# Patient Record
Sex: FEMALE | Race: WHITE | NOT HISPANIC OR LATINO
[De-identification: names, ages, dates, MRNs, and addresses within clinical notes are randomized per-mention and may not be internally consistent; named-entity substitution may affect disease eponyms.]

---

## 2019-01-01 ENCOUNTER — EXTERNAL RECORD (OUTPATIENT)
Dept: HEALTH INFORMATION MANAGEMENT | Facility: OTHER | Age: 59
End: 2019-01-01

## 2019-08-07 ENCOUNTER — EXTERNAL RECORD (OUTPATIENT)
Dept: GASTROENTEROLOGY | Age: 59
End: 2019-08-07

## 2019-08-07 ENCOUNTER — EXTERNAL RECORD (OUTPATIENT)
Dept: OTHER | Age: 59
End: 2019-08-07

## 2019-08-07 LAB
AMMONIA: 122 UMOL/L (ref 16–52)
APPEARANCE: CLEAR
BEDSIDE GLUCOSE: 107 MG/DL (ref 70–110)
BEDSIDE GLUCOSE: 136 MG/DL (ref 70–110)
BEDSIDE GLUCOSE: 144 MG/DL (ref 70–110)
BILIRUBIN: ABNORMAL
COLOR: ABNORMAL
CREATININE, RANDOM URINE: 382.5 MG/DL (ref 8–198)
CULTURE REFLEX COMMENT: NO
GLUCOSE, URINE, AUTOMATED: ABNORMAL MG/DL
KETONES, URINE, AUTOMATED: ABNORMAL MG/DL
LEUKOCYTE, URINE, AUTOMATED: ABNORMAL
NITRITE, URINE AUTOMATED: NEGATIVE
PH, URINE: 5 (ref 5–8)
PROTEIN, URINE: ABNORMAL MG/DL
SODIUM, RANDOM URINE: 10.5 MEQ/L (ref 40–220)
SPECIFIC GRAVITY UA: 1.02 (ref 1–1.03)
URINE, BLOOD, AUTOMATED: ABNORMAL
UROBILINOGEN, URINE, AUTOMATED: 2 MG/DL

## 2019-08-07 PROCEDURE — 99222 1ST HOSP IP/OBS MODERATE 55: CPT | Performed by: INTERNAL MEDICINE

## 2019-08-08 LAB
*MEAN CORPUSCULAR HGB CONC: 33.5 G/DL (ref 32.5–35.8)
A/G RATIO: 1.27 (ref 1.1–2.4)
ALANINE AMINOTRANSFE: 12 U/L (ref 7–52)
ALBUMIN, SERUM (ALB): 2.8 G/DL (ref 3.5–5.7)
ALKALINE PHOSPHATASE (ALK): 123 U/L (ref 34–104)
AMMONIA: 105 UMOL/L (ref 16–52)
ANION GAP: 8 MEQ/L (ref 8–16)
ASPARTATE AMINOTRANS: 21 U/L (ref 13–39)
BASOPHIL AUTOMATED: 0.4 %
BASOPHILS: 0 (ref 0–0.2)
BEDSIDE GLUCOSE: 114 MG/DL (ref 70–110)
BEDSIDE GLUCOSE: 182 MG/DL (ref 70–110)
BEDSIDE GLUCOSE: 183 MG/DL (ref 70–110)
BEDSIDE GLUCOSE: 70 MG/DL (ref 70–110)
BILIRUBIN, TOTAL: 1.7 MG/DL (ref 0.2–0.8)
BLOOD UREA NITROGEN (BUN): 24 MG/DL (ref 7–25)
BUN/CREATININE RATIO: 16.4 (ref 7.4–23)
CALCIUM, SERUM: 8.2 MG/DL (ref 8.6–10.3)
CARBON DIOXIDE: 22 MMOL/L (ref 21–31)
CHLORIDE, SERUM: 111 MMOL/L (ref 98–107)
CREATININE: 1.46 MG/DL (ref 0.6–1.2)
EOSINOPHIL AUTOMATED: 4.4 %
EOSINOPHILS: 0.2 (ref 0–0.5)
EST GLOMERULAR FILTRATION RATE: 37 1.73M SQ
GLUCOSE: 69 MG/DL (ref 70–99)
HEMATOCRIT: 28.3 % (ref 34.7–45.1)
HEMOGLOBIN: 9.5 GM/DL (ref 12–15.3)
K (POTASSIUM, SERUM): 3.8 MMOL/L (ref 3.5–5.1)
LYMPHOCYTE AUTOMATED: 24.5 %
LYMPHOCYTES: 1.2 (ref 0.6–3.4)
MEAN CORPUSCULAR HGB: 31.4 PG (ref 26–34)
MEAN CORPUSCULAR VOL: 93.8 FL (ref 80–100)
MEAN PLATELET VOLUME: 7.3 FL (ref 6.8–10.2)
MG (MAGNESIUM, SERUM: 1.6 MG/DL (ref 1.6–2.6)
MONOCYTE AUTOMATED: 11.5 %
MONOCYTES: 0.5 (ref 0.3–1)
NA (SODIUM, SERUM): 141 MMOL/L (ref 133–144)
NEUTROPHIL ABSOLUTE: 2.8 (ref 1.7–7.7)
NEUTROPHIL AUTOMATED: 59.2 %
PHOSPHORUS, SERUM: 3.7 MG/DL (ref 2.5–4.5)
PLATELET COUNT: 100 K/MM3 (ref 150–450)
PROTEIN TOTAL: 5 G/DL (ref 6.4–8.9)
RED BLOOD CELL COUNT: 3.02 M/MM3 (ref 3.63–5.04)
RED CELL DISTRIBUTIO: 14.5 % (ref 11.9–15.9)
TOTAL PROTEIN, BODY FLUID: 1.2 G/DL
WHITE BLOOD CELL COU: 4.8 K/MM3 (ref 4–11)

## 2019-08-08 PROCEDURE — 99232 SBSQ HOSP IP/OBS MODERATE 35: CPT | Performed by: INTERNAL MEDICINE

## 2019-08-09 LAB
*MEAN CORPUSCULAR HGB CONC: 34.4 G/DL (ref 32.5–35.8)
A/G RATIO: 1.68 (ref 1.1–2.4)
ALANINE AMINOTRANSFE: 11 U/L (ref 7–52)
ALBUMIN, SERUM (ALB): 3.2 G/DL (ref 3.5–5.7)
ALKALINE PHOSPHATASE (ALK): 105 U/L (ref 34–104)
ANION GAP: 6 MEQ/L (ref 8–16)
ASPARTATE AMINOTRANS: 22 U/L (ref 13–39)
BASOPHIL AUTOMATED: 0.7 %
BASOPHILS: 0 (ref 0–0.2)
BEDSIDE GLUCOSE: 148 MG/DL (ref 70–110)
BEDSIDE GLUCOSE: 164 MG/DL (ref 70–110)
BEDSIDE GLUCOSE: 198 MG/DL (ref 70–110)
BEDSIDE GLUCOSE: 220 MG/DL (ref 70–110)
BILIRUBIN, TOTAL: 1.6 MG/DL (ref 0.2–0.8)
BLOOD UREA NITROGEN (BUN): 20 MG/DL (ref 7–25)
BODY FLUID COLOR: YELLOW
BODY FLUID TYPE: NORMAL
BODY FLUID, DIFF COMMENT: NORMAL
BODY FLUID, EO%: 0 %
BODY FLUID, LYMPH%: 44 %
BODY FLUID, MONO%: 26 %
BODY FLUID, NE%: 15 %
BODY FLUID, NUCLEATED CELLS: 300 /CMM
BODY FLUID, OTHER CELLS%: 15 %
BUN/CREATININE RATIO: 16.8 (ref 7.4–23)
CALCIUM, SERUM: 8.4 MG/DL (ref 8.6–10.3)
CARBON DIOXIDE: 26 MMOL/L (ref 21–31)
CHLORIDE, SERUM: 110 MMOL/L (ref 98–107)
CREATININE: 1.19 MG/DL (ref 0.6–1.2)
EOSINOPHIL AUTOMATED: 5.3 %
EOSINOPHILS: 0.2 (ref 0–0.5)
EST GLOMERULAR FILTRATION RATE: 47 1.73M SQ
GLUCOSE: 140 MG/DL (ref 70–99)
HEMATOCRIT: 27.8 % (ref 34.7–45.1)
HEMOGLOBIN: 9.6 GM/DL (ref 12–15.3)
K (POTASSIUM, SERUM): 4 MMOL/L (ref 3.5–5.1)
LYMPHOCYTE AUTOMATED: 27.7 %
LYMPHOCYTES: 1.1 (ref 0.6–3.4)
MEAN CORPUSCULAR HGB: 32.3 PG (ref 26–34)
MEAN CORPUSCULAR VOL: 93.7 FL (ref 80–100)
MEAN PLATELET VOLUME: 7.7 FL (ref 6.8–10.2)
MG (MAGNESIUM, SERUM: 2 MG/DL (ref 1.6–2.6)
MONOCYTE AUTOMATED: 12.7 %
MONOCYTES: 0.5 (ref 0.3–1)
NA (SODIUM, SERUM): 142 MMOL/L (ref 133–144)
NEUTROPHIL ABSOLUTE: 2 (ref 1.7–7.7)
NEUTROPHIL AUTOMATED: 53.6 %
PLATELET COUNT: 92 K/MM3 (ref 150–450)
PROTEIN TOTAL: 5.1 G/DL (ref 6.4–8.9)
RBC BODY FLUID: < 1000 /CMM
RED BLOOD CELL COUNT: 2.97 M/MM3 (ref 3.63–5.04)
RED CELL DISTRIBUTIO: 14.3 % (ref 11.9–15.9)
WHITE BLOOD CELL COU: 3.8 K/MM3 (ref 4–11)

## 2019-08-09 PROCEDURE — 99232 SBSQ HOSP IP/OBS MODERATE 35: CPT | Performed by: INTERNAL MEDICINE

## 2019-08-10 ENCOUNTER — TELEPHONE (OUTPATIENT)
Dept: GASTROENTEROLOGY | Age: 59
End: 2019-08-10

## 2019-08-10 DIAGNOSIS — R18.8 OTHER ASCITES: Primary | ICD-10-CM

## 2019-08-10 LAB
*MEAN CORPUSCULAR HGB CONC: 34.7 G/DL (ref 32.5–35.8)
A/G RATIO: 1.58 (ref 1.1–2.4)
ALANINE AMINOTRANSFE: 9 U/L (ref 7–52)
ALBUMIN, SERUM (ALB): 3 G/DL (ref 3.5–5.7)
ALKALINE PHOSPHATASE (ALK): 94 U/L (ref 34–104)
ANION GAP: 3 MEQ/L (ref 8–16)
ASPARTATE AMINOTRANS: 19 U/L (ref 13–39)
BASOPHIL AUTOMATED: 1.2 %
BASOPHILS: 0.1 (ref 0–0.2)
BEDSIDE GLUCOSE: 169 MG/DL (ref 70–110)
BEDSIDE GLUCOSE: 221 MG/DL (ref 70–110)
BILIRUBIN, TOTAL: 1.4 MG/DL (ref 0.2–0.8)
BLOOD UREA NITROGEN (BUN): 15 MG/DL (ref 7–25)
BUN/CREATININE RATIO: 14.4 (ref 7.4–23)
CALCIUM, SERUM: 8.2 MG/DL (ref 8.6–10.3)
CARBON DIOXIDE: 26 MMOL/L (ref 21–31)
CHLORIDE, SERUM: 112 MMOL/L (ref 98–107)
CREATININE: 1.04 MG/DL (ref 0.6–1.2)
EOSINOPHIL AUTOMATED: 4.7 %
EOSINOPHILS: 0.2 (ref 0–0.5)
EST GLOMERULAR FILTRATION RATE: 55 1.73M SQ
GLUCOSE: 151 MG/DL (ref 70–99)
HEMATOCRIT: 27.8 % (ref 34.7–45.1)
HEMOGLOBIN: 9.7 GM/DL (ref 12–15.3)
K (POTASSIUM, SERUM): 3.7 MMOL/L (ref 3.5–5.1)
LYMPHOCYTE AUTOMATED: 23.7 %
LYMPHOCYTES: 1 (ref 0.6–3.4)
MEAN CORPUSCULAR HGB: 32.4 PG (ref 26–34)
MEAN CORPUSCULAR VOL: 93.4 FL (ref 80–100)
MEAN PLATELET VOLUME: 7.7 FL (ref 6.8–10.2)
MG (MAGNESIUM, SERUM: 1.6 MG/DL (ref 1.6–2.6)
MONOCYTE AUTOMATED: 12.9 %
MONOCYTES: 0.5 (ref 0.3–1)
NA (SODIUM, SERUM): 141 MMOL/L (ref 133–144)
NEUTROPHIL ABSOLUTE: 2.4 (ref 1.7–7.7)
NEUTROPHIL AUTOMATED: 57.5 %
PLATELET COUNT: 100 K/MM3 (ref 150–450)
PROTEIN TOTAL: 4.9 G/DL (ref 6.4–8.9)
RED BLOOD CELL COUNT: 2.98 M/MM3 (ref 3.63–5.04)
RED CELL DISTRIBUTIO: 14 % (ref 11.9–15.9)
WHITE BLOOD CELL COU: 4.1 K/MM3 (ref 4–11)

## 2019-08-12 ENCOUNTER — EXTERNAL RECORD (OUTPATIENT)
Dept: HEALTH INFORMATION MANAGEMENT | Facility: OTHER | Age: 59
End: 2019-08-12

## 2019-08-12 LAB
ALBUMIN, PERITONEAL FLUID: 1.3 G/DL
BODY FLUID CULTURE: NORMAL
GRAM STAIN: NORMAL
TOTAL PROTEIN, BODY FLUID: 1.7 G/DL

## 2019-08-13 LAB — ANAEROBIC CULTURE: NORMAL

## 2019-08-16 LAB
BODY FLUID COLOR: YELLOW
BODY FLUID TYPE: NORMAL
BODY FLUID, DIFF COMMENT: NORMAL
BODY FLUID, EO%: 0 %
BODY FLUID, LYMPH%: 42 %
BODY FLUID, MONO%: 4 %
BODY FLUID, NE%: 31 %
BODY FLUID, NUCLEATED CELLS: 500 /CMM
BODY FLUID, OTHER CELLS%: 23 %
RBC BODY FLUID: < 1000 /CMM

## 2019-08-23 ENCOUNTER — EXTERNAL RECORD (OUTPATIENT)
Dept: OTHER | Age: 59
End: 2019-08-23

## 2019-08-23 LAB
*MEAN CORPUSCULAR HGB CONC: 33.1 G/DL (ref 32.5–35.8)
*MEAN CORPUSCULAR HGB CONC: 34.3 G/DL (ref 32.5–35.8)
A/G RATIO: 1.22 (ref 1.1–2.4)
ALANINE AMINOTRANSFE: 12 U/L (ref 7–52)
ALBUMIN, SERUM (ALB): 2.8 G/DL (ref 3.5–5.7)
ALKALINE PHOSPHATASE (ALK): 127 U/L (ref 34–104)
ANION GAP: 6 MEQ/L (ref 8–16)
ASPARTATE AMINOTRANS: 28 U/L (ref 13–39)
BASOPHIL AUTOMATED: 0.2 %
BASOPHIL AUTOMATED: 1 %
BASOPHILS: 0 (ref 0–0.2)
BASOPHILS: 0 (ref 0–0.2)
BEDSIDE GLUCOSE: 127 MG/DL (ref 70–110)
BEDSIDE GLUCOSE: 139 MG/DL (ref 70–110)
BEDSIDE GLUCOSE: 144 MG/DL (ref 70–110)
BEDSIDE GLUCOSE: 183 MG/DL (ref 70–110)
BILIRUBIN, TOTAL: 1.9 MG/DL (ref 0.2–0.8)
BLOOD UREA NITROGEN (BUN): 13 MG/DL (ref 7–25)
BUN/CREATININE RATIO: 11.6 (ref 7.4–23)
CALCIUM, SERUM: 8.3 MG/DL (ref 8.6–10.3)
CARBON DIOXIDE: 26 MMOL/L (ref 21–31)
CHLORIDE, SERUM: 108 MMOL/L (ref 98–107)
CREATININE: 1.12 MG/DL (ref 0.6–1.2)
EOSINOPHIL AUTOMATED: 4.5 %
EOSINOPHIL AUTOMATED: 4.8 %
EOSINOPHILS: 0.2 (ref 0–0.5)
EOSINOPHILS: 0.3 (ref 0–0.5)
EST GLOMERULAR FILTRATION RATE: 50 1.73M SQ
GLUCOSE: 152 MG/DL (ref 70–99)
HEMATOCRIT: 31.1 % (ref 34.7–45.1)
HEMATOCRIT: 32.7 % (ref 34.7–45.1)
HEMOGLOBIN: 10.3 GM/DL (ref 12–15.3)
HEMOGLOBIN: 11.2 GM/DL (ref 12–15.3)
K (POTASSIUM, SERUM): 3.4 MMOL/L (ref 3.5–5.1)
LYMPHOCYTE AUTOMATED: 18.7 %
LYMPHOCYTE AUTOMATED: 19.8 %
LYMPHOCYTES: 0.9 (ref 0.6–3.4)
LYMPHOCYTES: 1.1 (ref 0.6–3.4)
MEAN CORPUSCULAR HGB: 31.1 PG (ref 26–34)
MEAN CORPUSCULAR HGB: 32.1 PG (ref 26–34)
MEAN CORPUSCULAR VOL: 93.6 FL (ref 80–100)
MEAN CORPUSCULAR VOL: 94 FL (ref 80–100)
MEAN PLATELET VOLUME: 8 FL (ref 6.8–10.2)
MEAN PLATELET VOLUME: 8.3 FL (ref 6.8–10.2)
MG (MAGNESIUM, SERUM: 1.8 MG/DL (ref 1.6–2.6)
MONOCYTE AUTOMATED: 11 %
MONOCYTE AUTOMATED: 11.2 %
MONOCYTES: 0.6 (ref 0.3–1)
MONOCYTES: 0.6 (ref 0.3–1)
NA (SODIUM, SERUM): 140 MMOL/L (ref 133–144)
NEUTROPHIL ABSOLUTE: 3.3 (ref 1.7–7.7)
NEUTROPHIL ABSOLUTE: 3.6 (ref 1.7–7.7)
NEUTROPHIL AUTOMATED: 64 %
NEUTROPHIL AUTOMATED: 64.8 %
PLATELET COUNT: 91 K/MM3 (ref 150–450)
PLATELET COUNT: 92 K/MM3 (ref 150–450)
PROTEIN TOTAL: 5.1 G/DL (ref 6.4–8.9)
RED BLOOD CELL COUNT: 3.31 M/MM3 (ref 3.63–5.04)
RED BLOOD CELL COUNT: 3.5 M/MM3 (ref 3.63–5.04)
RED CELL DISTRIBUTIO: 14.1 % (ref 11.9–15.9)
RED CELL DISTRIBUTIO: 14.7 % (ref 11.9–15.9)
WHITE BLOOD CELL COU: 5.1 K/MM3 (ref 4–11)
WHITE BLOOD CELL COU: 5.6 K/MM3 (ref 4–11)

## 2019-08-24 ENCOUNTER — EXTERNAL RECORD (OUTPATIENT)
Dept: GASTROENTEROLOGY | Age: 59
End: 2019-08-24

## 2019-08-24 LAB
A/G RATIO: 1.29 (ref 1.1–2.4)
ALANINE AMINOTRANSFE: 11 U/L (ref 7–52)
ALBUMIN, SERUM (ALB): 2.7 G/DL (ref 3.5–5.7)
ALKALINE PHOSPHATASE (ALK): 125 U/L (ref 34–104)
ANION GAP: 4 MEQ/L (ref 8–16)
ASPARTATE AMINOTRANS: 28 U/L (ref 13–39)
BEDSIDE GLUCOSE: 100 MG/DL (ref 70–110)
BEDSIDE GLUCOSE: 108 MG/DL (ref 70–110)
BEDSIDE GLUCOSE: 117 MG/DL (ref 70–110)
BEDSIDE GLUCOSE: 198 MG/DL (ref 70–110)
BILIRUBIN, TOTAL: 2.1 MG/DL (ref 0.2–0.8)
BLOOD UREA NITROGEN (BUN): 11 MG/DL (ref 7–25)
BUN/CREATININE RATIO: 9.9 (ref 7.4–23)
CALCIUM, SERUM: 8.1 MG/DL (ref 8.6–10.3)
CARBON DIOXIDE: 25 MMOL/L (ref 21–31)
CHLORIDE, SERUM: 110 MMOL/L (ref 98–107)
CREATININE: 1.11 MG/DL (ref 0.6–1.2)
EST GLOMERULAR FILTRATION RATE: 51 1.73M SQ
GLUCOSE: 106 MG/DL (ref 70–99)
K (POTASSIUM, SERUM): 3.8 MMOL/L (ref 3.5–5.1)
NA (SODIUM, SERUM): 139 MMOL/L (ref 133–144)
PROTEIN TOTAL: 4.8 G/DL (ref 6.4–8.9)

## 2019-08-24 PROCEDURE — 99221 1ST HOSP IP/OBS SF/LOW 40: CPT | Performed by: INTERNAL MEDICINE

## 2019-08-25 LAB
*MEAN CORPUSCULAR HGB CONC: 33.2 G/DL (ref 32.5–35.8)
ALBUMIN, PERITONEAL FLUID: 1 G/DL
ANION GAP: 5 MEQ/L (ref 8–16)
BEDSIDE GLUCOSE: 133 MG/DL (ref 70–110)
BLOOD UREA NITROGEN (BUN): 11 MG/DL (ref 7–25)
BUN/CREATININE RATIO: 10.3 (ref 7.4–23)
CALCIUM, SERUM: 8 MG/DL (ref 8.6–10.3)
CARBON DIOXIDE: 26 MMOL/L (ref 21–31)
CHLORIDE, SERUM: 109 MMOL/L (ref 98–107)
CREATININE: 1.07 MG/DL (ref 0.6–1.2)
EST GLOMERULAR FILTRATION RATE: 53 1.73M SQ
GLUCOSE: 118 MG/DL (ref 70–99)
HEMATOCRIT: 30.5 % (ref 34.7–45.1)
HEMOGLOBIN: 10.1 GM/DL (ref 12–15.3)
K (POTASSIUM, SERUM): 3.7 MMOL/L (ref 3.5–5.1)
MEAN CORPUSCULAR HGB: 31.2 PG (ref 26–34)
MEAN CORPUSCULAR VOL: 94.1 FL (ref 80–100)
MEAN PLATELET VOLUME: 8.1 FL (ref 6.8–10.2)
NA (SODIUM, SERUM): 140 MMOL/L (ref 133–144)
PLATELET COUNT: 86 K/MM3 (ref 150–450)
RED BLOOD CELL COUNT: 3.24 M/MM3 (ref 3.63–5.04)
RED CELL DISTRIBUTIO: 14.4 % (ref 11.9–15.9)
WHITE BLOOD CELL COU: 4.3 K/MM3 (ref 4–11)

## 2019-08-26 LAB
BODY FLUID COLOR: YELLOW
BODY FLUID TYPE: NORMAL
BODY FLUID, DIFF COMMENT: NORMAL
BODY FLUID, EO%: 0 %
BODY FLUID, LYMPH%: 29 %
BODY FLUID, MONO%: 32 %
BODY FLUID, NE%: 25 %
BODY FLUID, NUCLEATED CELLS: 500 /CMM
BODY FLUID, OTHER CELLS%: 9 %
RBC BODY FLUID: < 1000 /CMM

## 2019-08-29 LAB
BODY FLUID CULTURE: NORMAL
GRAM STAIN: NORMAL

## 2019-09-16 LAB
FUNGAL SMEAR: NORMAL
FUNGUS CULTURE: NORMAL

## 2019-10-16 ENCOUNTER — OFFICE VISIT (OUTPATIENT)
Dept: SURGERY | Facility: CLINIC | Age: 59
End: 2019-10-16
Payer: COMMERCIAL

## 2019-10-16 VITALS
SYSTOLIC BLOOD PRESSURE: 138 MMHG | WEIGHT: 215 LBS | HEART RATE: 72 BPM | RESPIRATION RATE: 18 BRPM | DIASTOLIC BLOOD PRESSURE: 76 MMHG | OXYGEN SATURATION: 100 % | TEMPERATURE: 98 F

## 2019-10-16 DIAGNOSIS — K74.60 LIVER CIRRHOSIS SECONDARY TO NASH (HCC): Primary | ICD-10-CM

## 2019-10-16 DIAGNOSIS — K75.81 LIVER CIRRHOSIS SECONDARY TO NASH (HCC): Primary | ICD-10-CM

## 2019-10-16 NOTE — PROGRESS NOTES
[facility] at [address], [address] [place] [address] [address]  [address]  [phone] ([phone] no murmur, no edema  Lungs: Clear to auscultation (B)  Abd: non-distended, non-tender, no hepatosplenomegaly  Derm: no rash  Neuro: A&Ox3, no asterixis  Psych: normal affect/mood    Data:  2/22/18 serologies - negative HAV/HBV/HCV, JOAQUIN(-), A1AT normal    6

## 2019-12-02 ENCOUNTER — OFFICE VISIT (OUTPATIENT)
Dept: SURGERY | Facility: CLINIC | Age: 59
End: 2019-12-02
Payer: COMMERCIAL

## 2019-12-02 VITALS
RESPIRATION RATE: 16 BRPM | WEIGHT: 230.5 LBS | BODY MASS INDEX: 34.93 KG/M2 | OXYGEN SATURATION: 100 % | HEIGHT: 68 IN | DIASTOLIC BLOOD PRESSURE: 70 MMHG | HEART RATE: 80 BPM | SYSTOLIC BLOOD PRESSURE: 139 MMHG

## 2019-12-02 DIAGNOSIS — K74.60 LIVER CIRRHOSIS SECONDARY TO NASH (HCC): Primary | ICD-10-CM

## 2019-12-02 DIAGNOSIS — K75.81 LIVER CIRRHOSIS SECONDARY TO NASH (HCC): Primary | ICD-10-CM

## 2019-12-02 RX ORDER — TRAMADOL HYDROCHLORIDE 50 MG/1
TABLET ORAL
Refills: 0 | COMMUNITY
Start: 2019-11-20

## 2019-12-02 RX ORDER — TRAZODONE HYDROCHLORIDE 150 MG/1
TABLET ORAL
Refills: 0 | COMMUNITY
Start: 2019-11-12

## 2019-12-02 RX ORDER — FUROSEMIDE 40 MG/1
40 TABLET ORAL 2 TIMES DAILY
Qty: 60 TABLET | Refills: 5 | Status: SHIPPED | OUTPATIENT
Start: 2019-12-02

## 2019-12-02 RX ORDER — GLIPIZIDE 10 MG/1
TABLET ORAL
Refills: 2 | COMMUNITY
Start: 2019-11-01

## 2019-12-02 RX ORDER — PIOGLITAZONEHYDROCHLORIDE 15 MG/1
TABLET ORAL
Refills: 0 | COMMUNITY
Start: 2019-11-04

## 2019-12-02 RX ORDER — SPIRONOLACTONE 25 MG/1
25 TABLET ORAL 2 TIMES DAILY
Qty: 60 TABLET | Refills: 5 | Status: SHIPPED | OUTPATIENT
Start: 2019-12-02

## 2019-12-02 RX ORDER — FLUCONAZOLE 150 MG/1
TABLET ORAL
Refills: 0 | COMMUNITY
Start: 2019-11-04 | End: 2020-03-02 | Stop reason: ALTCHOICE

## 2019-12-02 RX ORDER — LEVOTHYROXINE SODIUM 112 UG/1
TABLET ORAL
Refills: 2 | COMMUNITY
Start: 2019-11-13

## 2019-12-02 RX ORDER — POTASSIUM CHLORIDE 20 MEQ/1
TABLET, EXTENDED RELEASE ORAL
Refills: 0 | COMMUNITY
Start: 2019-08-27

## 2019-12-02 RX ORDER — MONTELUKAST SODIUM 10 MG/1
TABLET ORAL
Refills: 1 | COMMUNITY
Start: 2019-11-04

## 2019-12-02 RX ORDER — FUROSEMIDE 40 MG/1
TABLET ORAL
Refills: 0 | COMMUNITY
Start: 2019-10-07 | End: 2019-12-02 | Stop reason: DRUGHIGH

## 2019-12-02 RX ORDER — SPIRONOLACTONE 25 MG/1
TABLET ORAL
Refills: 3 | COMMUNITY
Start: 2019-11-01 | End: 2019-12-02 | Stop reason: DRUGHIGH

## 2019-12-02 NOTE — PROGRESS NOTES
[facility] at [address], [address] [place] [address] [address]  [address]  [phone] ([phone]) Lactulose  Levothyroxine  Tramadol  Trazodone     Social History:  No ETOH  No tobacco  No drugs       Allergies:   Penicillins             RASH    Comment:HIVES             HIVES    Exam:  /70 (BP Location: Left arm, Patient Position: Sitting, Cuf [address]  [place] [address], 7th floor, [place], [place], [address] [place] [address] (office)  [phone] (fax)  [email or web address]

## 2020-02-19 ENCOUNTER — EXTERNAL RECORD (OUTPATIENT)
Dept: OTHER | Age: 60
End: 2020-02-19

## 2020-02-19 ENCOUNTER — TELEPHONE (OUTPATIENT)
Dept: SURGERY | Facility: CLINIC | Age: 60
End: 2020-02-19

## 2020-02-19 ENCOUNTER — EXTERNAL RECORD (OUTPATIENT)
Dept: GASTROENTEROLOGY | Age: 60
End: 2020-02-19

## 2020-02-19 DIAGNOSIS — K75.81 LIVER CIRRHOSIS SECONDARY TO NASH (HCC): Primary | ICD-10-CM

## 2020-02-19 DIAGNOSIS — K74.60 LIVER CIRRHOSIS SECONDARY TO NASH (HCC): Primary | ICD-10-CM

## 2020-02-19 LAB
BEDSIDE GLUCOSE: 110 MG/DL (ref 70–110)
BEDSIDE GLUCOSE: 126 MG/DL (ref 70–110)
BEDSIDE GLUCOSE: 224 MG/DL (ref 70–110)
CREATININE, RANDOM URINE: 90.5 MG/DL (ref 8–198)
SODIUM, RANDOM URINE: 62.5 MEQ/L (ref 40–220)
TOTAL PROTEIN, RANDOM URINE: 5 MG/DL
TP/CREA RATIO, URINE RANDOM: 0.05 (ref 0–0.19)

## 2020-02-19 PROCEDURE — 99222 1ST HOSP IP/OBS MODERATE 55: CPT | Performed by: INTERNAL MEDICINE

## 2020-02-19 NOTE — TELEPHONE ENCOUNTER
Returned patient's call regarding recent admission. Placed order for standing paracentesis and having office faxed to [facility].     YUKI Mills  Nurse Practitioner, Hepatology  [phone] (office) vaginal bleeding

## 2020-02-20 LAB
*MEAN CORPUSCULAR HGB CONC: 33.3 G/DL (ref 32.5–35.8)
A/G RATIO: 0.92 (ref 1.1–2.4)
ALANINE AMINOTRANSFE: 12 U/L (ref 7–52)
ALBUMIN, SERUM (ALB): 2.4 G/DL (ref 3.5–5.7)
ALKALINE PHOSPHATASE (ALK): 94 U/L (ref 34–104)
AMMONIA: 59 UMOL/L (ref 16–52)
ANION GAP: 5 MEQ/L (ref 6.2–14.7)
ASPARTATE AMINOTRANS: 32 U/L (ref 13–39)
BASOPHIL AUTOMATED: 0.4 %
BASOPHILS: 0 (ref 0–0.2)
BEDSIDE GLUCOSE: 103 MG/DL (ref 70–110)
BEDSIDE GLUCOSE: 158 MG/DL (ref 70–110)
BEDSIDE GLUCOSE: 91 MG/DL (ref 70–110)
BEDSIDE GLUCOSE: 96 MG/DL (ref 70–110)
BILIRUBIN, TOTAL: 2 MG/DL (ref 0.2–0.8)
BLOOD UREA NITROGEN (BUN): 15 MG/DL (ref 7–25)
BUN/CREATININE RATIO: 9.7 (ref 7.4–23)
CALCIUM, SERUM: 8.1 MG/DL (ref 8.6–10.3)
CARBON DIOXIDE: 32 MMOL/L (ref 21–31)
CHLORIDE, SERUM: 102 MMOL/L (ref 98–107)
CREATININE: 1.54 MG/DL (ref 0.6–1.2)
EOSINOPHIL AUTOMATED: 6.5 %
EOSINOPHILS: 0.2 (ref 0–0.5)
EST GLOMERULAR FILTRATION RATE: 35 1.73M SQ
GLUCOSE: 100 MG/DL (ref 70–99)
HEMATOCRIT: 30.2 % (ref 34.7–45.1)
HEMOGLOBIN: 10.1 GM/DL (ref 12–15.3)
INTERNATIONAL NORMAL: 1.7 (ref 0.8–1.1)
K (POTASSIUM, SERUM): 3.5 MMOL/L (ref 3.5–5.1)
LYMPHOCYTE AUTOMATED: 24.4 %
LYMPHOCYTES: 0.9 (ref 0.6–3.4)
MEAN CORPUSCULAR HGB: 32.3 PG (ref 26–34)
MEAN CORPUSCULAR VOL: 96.8 FL (ref 80–100)
MEAN PLATELET VOLUME: 7.6 FL (ref 6.8–10.2)
MONOCYTE AUTOMATED: 10.4 %
MONOCYTES: 0.4 (ref 0.3–1)
NA (SODIUM, SERUM): 139 MMOL/L (ref 133–144)
NEUTROPHIL ABSOLUTE: 2.2 (ref 1.7–7.7)
NEUTROPHIL AUTOMATED: 58.3 %
PLATELET COUNT: 75 K/MM3 (ref 150–450)
PROTEIN TOTAL: 5 G/DL (ref 6.4–8.9)
PROTHROMBIN TIME (PATIENT): 20.1 SECONDS (ref 10.1–13.1)
RED BLOOD CELL COUNT: 3.12 M/MM3 (ref 3.63–5.04)
RED CELL DISTRIBUTIO: 15.6 % (ref 11.9–15.9)
WHITE BLOOD CELL COU: 3.8 K/MM3 (ref 4–11)

## 2020-02-20 PROCEDURE — 99233 SBSQ HOSP IP/OBS HIGH 50: CPT | Performed by: INTERNAL MEDICINE

## 2020-02-21 LAB
*MEAN CORPUSCULAR HGB CONC: 34 G/DL (ref 32.5–35.8)
A/G RATIO: 0.85 (ref 1.1–2.4)
ALANINE AMINOTRANSFE: 9 U/L (ref 7–52)
ALBUMIN, PERITONEAL FLUID: 1 G/DL
ALBUMIN, SERUM (ALB): 2.3 G/DL (ref 3.5–5.7)
ALKALINE PHOSPHATASE (ALK): 95 U/L (ref 34–104)
ANION GAP: 7 MEQ/L (ref 6.2–14.7)
ASPARTATE AMINOTRANS: 30 U/L (ref 13–39)
BASOPHIL AUTOMATED: 0.7 %
BASOPHILS: 0 (ref 0–0.2)
BEDSIDE GLUCOSE: 132 MG/DL (ref 70–110)
BEDSIDE GLUCOSE: 96 MG/DL (ref 70–110)
BILIRUBIN, TOTAL: 1.7 MG/DL (ref 0.2–0.8)
BLOOD UREA NITROGEN (BUN): 13 MG/DL (ref 7–25)
BODY FLUID COLOR: YELLOW
BODY FLUID TYPE: NORMAL
BODY FLUID, DIFF COMMENT: NORMAL
BODY FLUID, EO%: 0 %
BODY FLUID, LYMPH%: 37 %
BODY FLUID, MONO%: 18 %
BODY FLUID, NE%: 7 %
BODY FLUID, NUCLEATED CELLS: 220 /CMM
BODY FLUID, OTHER CELLS%: 38 %
BUN/CREATININE RATIO: 8.7 (ref 7.4–23)
CALCIUM, SERUM: 8.1 MG/DL (ref 8.6–10.3)
CARBON DIOXIDE: 29 MMOL/L (ref 21–31)
CHLORIDE, SERUM: 102 MMOL/L (ref 98–107)
CREATININE: 1.49 MG/DL (ref 0.6–1.2)
EOSINOPHIL AUTOMATED: 5 %
EOSINOPHILS: 0.2 (ref 0–0.5)
EST GLOMERULAR FILTRATION RATE: 36 1.73M SQ
GLUCOSE, BODY FLUID: 127 MG/DL
GLUCOSE: 152 MG/DL (ref 70–99)
HEMATOCRIT: 30.8 % (ref 34.7–45.1)
HEMOGLOBIN: 10.5 GM/DL (ref 12–15.3)
INTERNATIONAL NORMAL: 1.7 (ref 0.8–1.1)
K (POTASSIUM, SERUM): 3.2 MMOL/L (ref 3.5–5.1)
LYMPHOCYTE AUTOMATED: 23.3 %
LYMPHOCYTES: 0.9 (ref 0.6–3.4)
MEAN CORPUSCULAR HGB: 33.1 PG (ref 26–34)
MEAN CORPUSCULAR VOL: 97.2 FL (ref 80–100)
MEAN PLATELET VOLUME: 7.8 FL (ref 6.8–10.2)
MONOCYTE AUTOMATED: 9.6 %
MONOCYTES: 0.4 (ref 0.3–1)
NA (SODIUM, SERUM): 138 MMOL/L (ref 133–144)
NEUTROPHIL ABSOLUTE: 2.3 (ref 1.7–7.7)
NEUTROPHIL AUTOMATED: 61.4 %
PLATELET COUNT: 67 K/MM3 (ref 150–450)
PROTEIN TOTAL: 5 G/DL (ref 6.4–8.9)
PROTHROMBIN TIME (PATIENT): 20.4 SECONDS (ref 10.1–13.1)
RBC BODY FLUID: < 1000 /CMM
RED BLOOD CELL COUNT: 3.17 M/MM3 (ref 3.63–5.04)
RED CELL DISTRIBUTIO: 14.9 % (ref 11.9–15.9)
WHITE BLOOD CELL COU: 3.7 K/MM3 (ref 4–11)

## 2020-02-26 LAB
BODY FLUID CULTURE: NORMAL
GRAM STAIN: NORMAL

## 2020-02-27 LAB — ANAEROBIC CULTURE: NORMAL

## 2020-03-02 ENCOUNTER — OFFICE VISIT (OUTPATIENT)
Dept: SURGERY | Facility: CLINIC | Age: 60
End: 2020-03-02
Payer: COMMERCIAL

## 2020-03-02 VITALS
HEART RATE: 78 BPM | DIASTOLIC BLOOD PRESSURE: 69 MMHG | SYSTOLIC BLOOD PRESSURE: 124 MMHG | WEIGHT: 216.5 LBS | OXYGEN SATURATION: 99 % | HEIGHT: 68 IN | RESPIRATION RATE: 16 BRPM | BODY MASS INDEX: 32.81 KG/M2

## 2020-03-02 DIAGNOSIS — K75.81 LIVER CIRRHOSIS SECONDARY TO NASH (HCC): Primary | ICD-10-CM

## 2020-03-02 DIAGNOSIS — K74.60 LIVER CIRRHOSIS SECONDARY TO NASH (HCC): Primary | ICD-10-CM

## 2020-03-02 RX ORDER — PANTOPRAZOLE SODIUM 20 MG/1
20 TABLET, DELAYED RELEASE ORAL
COMMUNITY

## 2020-03-02 RX ORDER — CHOLECALCIFEROL (VITAMIN D3) 50 MCG
TABLET ORAL
COMMUNITY
Start: 2020-02-26

## 2020-03-02 NOTE — PROGRESS NOTES
[facility] at [facility]  [address] [facility], [address]  [address]  [phone] ([phone]) tobacco  No drugs       Allergies:   Penicillins             RASH    Comment:HIVES             HIVES    Exam:  There were no vitals taken for this visit.   Gen: NAD  HEENT: Anicteric  Lymph: no cervical LAD  Chest: no angiomata  CV: RRR, no murmur, 1+ edema show not immune to HAV and HBV; should get vaccinated. Return in 2 months. Patient discussed with and/or seen by Dr. Tamiko Nathan. YUKI Karimi  Nurse Practitioner  [facility] of [address]  [address]  [facility] [employer], [facility]

## 2020-03-10 ENCOUNTER — TELEPHONE (OUTPATIENT)
Dept: SURGERY | Facility: CLINIC | Age: 60
End: 2020-03-10

## 2020-03-10 NOTE — TELEPHONE ENCOUNTER
LVM letting patient know that she should begin receiving rifaximin from [facility]. There had been a delay and medication was not previously sent.     Additionally asked patient to call back regarding current lactulose prescription; patient indicated lou

## 2020-04-16 ENCOUNTER — TELEPHONE (OUTPATIENT)
Dept: SURGERY | Facility: CLINIC | Age: 60
End: 2020-04-16

## 2020-04-16 DIAGNOSIS — K74.60 LIVER CIRRHOSIS SECONDARY TO NASH (NONALCOHOLIC STEATOHEPATITIS) (HCC): Primary | ICD-10-CM

## 2020-04-16 DIAGNOSIS — K75.81 LIVER CIRRHOSIS SECONDARY TO NASH (NONALCOHOLIC STEATOHEPATITIS) (HCC): Primary | ICD-10-CM

## 2020-04-16 NOTE — TELEPHONE ENCOUNTER
Returned patient's phone call stating need for weekly paracentesis. Order updated; requesting office to fax to [facility].     YUKI Karimi  Nurse Practitioner, Hepatology  [phone] (office)

## 2020-05-04 ENCOUNTER — TELEPHONE (OUTPATIENT)
Dept: SURGERY | Facility: CLINIC | Age: 60
End: 2020-05-04

## 2020-05-04 NOTE — TELEPHONE ENCOUNTER
[facility] at [facility]  [address] [facility], [address]  [address]  [phone] ([phone]) Needing paracentesis more frequently. She reports getting weekly at this point  - Reports having labs done at [facility] in April but I cannot pull up records from [facility]  - Regardless, will plan on proceeding with transplant work up.  TIPS for recurrent

## 2020-05-13 LAB
COVID-19 RESULT: NOT DETECTED
COVID-19 SOURCE: NORMAL

## 2021-09-14 ENCOUNTER — LAB SERVICES (OUTPATIENT)
Dept: LAB | Age: 61
End: 2021-09-14

## 2021-09-14 ENCOUNTER — LAB REQUISITION (OUTPATIENT)
Dept: LAB | Age: 61
End: 2021-09-14

## 2021-09-14 DIAGNOSIS — Z51.81 ENCOUNTER FOR THERAPEUTIC DRUG MONITORING: ICD-10-CM

## 2021-09-14 DIAGNOSIS — Z94.4 LIVER TRANSPLANT STATUS (CMD): ICD-10-CM

## 2021-09-14 DIAGNOSIS — Z51.81 ENCOUNTER FOR THERAPEUTIC DRUG LEVEL MONITORING: ICD-10-CM

## 2021-09-14 DIAGNOSIS — Z94.4 LIVER REPLACED BY TRANSPLANT (CMD): Primary | ICD-10-CM

## 2021-09-14 LAB
ALBUMIN SERPL BCG-MCNC: 3.8 G/DL (ref 3.6–5.1)
ALP SERPL-CCNC: 92 U/L (ref 45–130)
ALT SERPL W/O P-5'-P-CCNC: 17 U/L (ref 7–34)
AST SERPL-CCNC: 13 U/L (ref 9–37)
BASOPHIL %: 0.2 % (ref 0–1.2)
BASOPHIL ABSOLUTE #: 0 10*3/UL (ref 0–0.1)
BILIRUB DIRECT SERPL-MCNC: 0.1 MG/DL (ref 0–0.2)
BILIRUB SERPL-MCNC: 0.6 MG/DL (ref 0–1)
BUN SERPL-MCNC: 32 MG/DL (ref 7–20)
CALCIUM SERPL-MCNC: 9.2 MG/DL (ref 8.6–10.6)
CHLORIDE SERPL-SCNC: 100 MMOL/L (ref 96–107)
CREAT SERPL-MCNC: 1.5 MG/DL (ref 0.5–1.4)
DIFFERENTIAL TYPE: ABNORMAL
EOSINOPHIL %: 1 % (ref 0–10)
EOSINOPHIL ABSOLUTE #: 0.1 10*3/UL (ref 0–0.5)
GFR SERPL CREATININE-BSD FRML MDRD: 35 ML/MIN/{1.73M2}
GFR SERPL CREATININE-BSD FRML MDRD: 43 ML/MIN/{1.73M2}
GLUCOSE SERPL-MCNC: 320 MG/DL (ref 70–200)
HCO3 SERPL-SCNC: 25 MMOL/L (ref 22–32)
HEMATOCRIT: 32.4 % (ref 34–45)
HEMOGLOBIN: 10.4 G/DL (ref 11.2–15.7)
IMMATURE GRANULOCYTE ABSOLUTE: 0.02 10*3/UL (ref 0–0.05)
IMMATURE GRANULOCYTE PERCENT: 0.4 % (ref 0–0.5)
LYMPH PERCENT: 46.4 % (ref 20.5–51.1)
LYMPHOCYTE ABSOLUTE #: 2.4 10*3/UL (ref 1.2–3.4)
MEAN CORPUSCULAR HGB CONCENTRATION: 32.1 % (ref 32–36)
MEAN CORPUSCULAR HGB: 30.1 PG (ref 27–34)
MEAN CORPUSCULAR VOLUME: 93.9 FL (ref 79–95)
MEAN PLATELET VOLUME: 9.5 FL (ref 8.6–12.4)
MONOCYTE ABSOLUTE #: 0.4 10*3/UL (ref 0.2–0.9)
MONOCYTE PERCENT: 7.8 % (ref 4.3–12.9)
NEUTROPHIL ABSOLUTE #: 2.3 10*3/UL (ref 1.4–6.5)
NEUTROPHIL PERCENT: 44.2 % (ref 34–73.5)
PLATELET COUNT: 172 10*3/UL (ref 150–400)
POTASSIUM SERPL-SCNC: 4.4 MMOL/L (ref 3.5–5.3)
PROT SERPL-MCNC: 6 G/DL (ref 6.2–8.1)
RED BLOOD CELL COUNT: 3.45 10*6/UL (ref 3.7–5.2)
RED CELL DISTRIBUTION WIDTH: 14.6 % (ref 11.3–14.8)
SODIUM SERPL-SCNC: 135 MMOL/L (ref 136–146)
WHITE BLOOD CELL COUNT: 5.1 10*3/UL (ref 4–10)

## 2021-09-14 PROCEDURE — 82977 ASSAY OF GGT: CPT | Performed by: INTERNAL MEDICINE

## 2021-09-14 PROCEDURE — 82248 BILIRUBIN DIRECT: CPT | Performed by: INTERNAL MEDICINE

## 2021-09-14 PROCEDURE — 85025 COMPLETE CBC W/AUTO DIFF WBC: CPT | Performed by: INTERNAL MEDICINE

## 2021-09-14 PROCEDURE — 80197 ASSAY OF TACROLIMUS: CPT | Performed by: CLINICAL MEDICAL LABORATORY

## 2021-09-14 PROCEDURE — 80053 COMPREHEN METABOLIC PANEL: CPT | Performed by: INTERNAL MEDICINE

## 2021-09-14 PROCEDURE — 36415 COLL VENOUS BLD VENIPUNCTURE: CPT | Performed by: INTERNAL MEDICINE

## 2021-09-15 LAB
GGT SERPL-CCNC: 42 U/L (ref 12–43)
TACROLIMUS BLD-MCNC: 5.5 NG/ML (ref 5–20)
TACROLIMUS BLD-MCNC: 5.5 NG/ML (ref 5–20)

## 2021-09-16 LAB — FAX RESULTS: NORMAL

## 2021-09-22 ENCOUNTER — EXTERNAL RECORD (OUTPATIENT)
Dept: OTHER | Age: 61
End: 2021-09-22

## 2021-09-22 LAB
BEDSIDE GLUCOSE: 284 MG/DL (ref 70–110)
BEDSIDE GLUCOSE: 403 MG/DL (ref 70–110)
TROPONIN I HIGH SENSITIVITY: 3 PG/ML (ref 0–12)

## 2021-09-22 PROCEDURE — 93010 ELECTROCARDIOGRAM REPORT: CPT | Performed by: INTERNAL MEDICINE

## 2021-09-23 ENCOUNTER — EXTERNAL RECORD (OUTPATIENT)
Dept: CARDIOLOGY | Age: 61
End: 2021-09-23

## 2021-09-23 LAB
*MEAN CORPUSCULAR HGB CONC: 32.7 G/DL (ref 32.5–35.8)
A/G RATIO: 1.72 (ref 1.1–2.4)
ALANINE AMINOTRANSFE: 37 U/L (ref 7–52)
ALBUMIN, SERUM (ALB): 3.1 G/DL (ref 3.5–5.7)
ALKALINE PHOSPHATASE (ALK): 51 U/L (ref 34–104)
ANION GAP: 6 MEQ/L (ref 6.2–14.7)
ASPARTATE AMINOTRANS: 65 U/L (ref 13–39)
BASOPHIL AUTOMATED: 0 %
BASOPHILS: 0 (ref 0–0.14)
BEDSIDE GLUCOSE: 144 MG/DL (ref 70–110)
BEDSIDE GLUCOSE: 217 MG/DL (ref 70–110)
BEDSIDE GLUCOSE: 295 MG/DL (ref 70–110)
BEDSIDE GLUCOSE: 324 MG/DL (ref 70–110)
BEDSIDE GLUCOSE: 453 MG/DL (ref 70–110)
BILIRUBIN, TOTAL: 0.4 MG/DL (ref 0.2–0.8)
BLOOD UREA NITROGEN (BUN): 29 MG/DL (ref 7–25)
BUN/CREATININE RATIO: 18.8 (ref 7.4–23)
CALCIUM, SERUM: 8.4 MG/DL (ref 8.6–10.3)
CARBON DIOXIDE: 29 MEQ/L (ref 21–31)
CHLORIDE, SERUM: 106 MMOL/L (ref 98–107)
CHRONIC KIDNEY DISEASE STAGE: ABNORMAL
CREATININE: 1.54 MG/DL (ref 0.6–1.2)
EOSINOPHIL AUTOMATED: 0.6 %
EOSINOPHILS: 0 (ref 0–0.6)
EST GLOMERULAR FILTRATION RATE: 35 ML/MIN
ESTIMATED AVERAGE GLUCOSE: 229 MG/DL
GLUCOSE: 188 MG/DL (ref 70–99)
HEMATOCRIT: 26.7 % (ref 34.7–45.1)
HEMOGLOBIN A1C (GLYCOSYLATED): 9.6 %
HEMOGLOBIN: 8.7 GM/DL (ref 12–15.3)
K (POTASSIUM, SERUM): 4.3 MMOL/L (ref 3.5–5.2)
LYMPHOCYTE AUTOMATED: 49.6 %
LYMPHOCYTES: 2.5 (ref 0.78–3.73)
MEAN CORPUSCULAR HGB: 29.5 PG (ref 26–34)
MEAN CORPUSCULAR VOL: 90.2 FL (ref 80–100)
MEAN PLATELET VOLUME: 6.7 FL (ref 6.8–10.2)
MONOCYTE AUTOMATED: 5.7 %
MONOCYTES: 0.3 (ref 0.17–1)
NA (SODIUM, SERUM): 141 MMOL/L (ref 133–144)
NEUTROPHIL ABSOLUTE: 2.2 (ref 1.91–7.6)
NEUTROPHIL AUTOMATED: 44.1 %
PLATELET COUNT: 152 K/MM3 (ref 150–450)
PROTEIN TOTAL: 4.9 G/DL (ref 6.4–8.9)
RED BLOOD CELL COUNT: 2.96 M/MM3 (ref 3.63–5.04)
RED CELL DISTRIBUTIO: 15.1 % (ref 11.9–15.9)
TROPONIN I HIGH SENSITIVITY: 3 PG/ML (ref 0–12)
WHITE BLOOD CELL COU: 5 K/MM3 (ref 4–11)

## 2021-09-23 PROCEDURE — 93306 TTE W/DOPPLER COMPLETE: CPT | Performed by: INTERNAL MEDICINE

## 2021-09-23 PROCEDURE — 93010 ELECTROCARDIOGRAM REPORT: CPT | Performed by: INTERNAL MEDICINE

## 2021-09-23 PROCEDURE — 99223 1ST HOSP IP/OBS HIGH 75: CPT | Performed by: INTERNAL MEDICINE

## 2021-09-24 LAB
*MEAN CORPUSCULAR HGB CONC: 32.6 G/DL (ref 32.5–35.8)
ANION GAP: 4 MEQ/L (ref 6.2–14.7)
BASOPHIL AUTOMATED: 0.4 %
BASOPHILS: 0 (ref 0–0.14)
BEDSIDE GLUCOSE: 162 MG/DL (ref 70–110)
BEDSIDE GLUCOSE: 248 MG/DL (ref 70–110)
BEDSIDE GLUCOSE: 336 MG/DL (ref 70–110)
BEDSIDE GLUCOSE: 372 MG/DL (ref 70–110)
BLOOD UREA NITROGEN (BUN): 27 MG/DL (ref 7–25)
BUN/CREATININE RATIO: 18.9 (ref 7.4–23)
CALCIUM, SERUM: 8.6 MG/DL (ref 8.6–10.3)
CARBON DIOXIDE: 29 MEQ/L (ref 21–31)
CHLORIDE, SERUM: 107 MMOL/L (ref 98–107)
CHRONIC KIDNEY DISEASE STAGE: ABNORMAL
CREATININE: 1.43 MG/DL (ref 0.6–1.2)
EOSINOPHIL AUTOMATED: 0.8 %
EOSINOPHILS: 0 (ref 0–0.6)
EST GLOMERULAR FILTRATION RATE: 38 ML/MIN
GLUCOSE: 206 MG/DL (ref 70–99)
HEMATOCRIT: 26 % (ref 34.7–45.1)
HEMOGLOBIN: 8.5 GM/DL (ref 12–15.3)
K (POTASSIUM, SERUM): 4.4 MMOL/L (ref 3.5–5.2)
LYMPHOCYTE AUTOMATED: 52.4 %
LYMPHOCYTES: 2.4 (ref 0.78–3.73)
MEAN CORPUSCULAR HGB: 29.8 PG (ref 26–34)
MEAN CORPUSCULAR VOL: 91.3 FL (ref 80–100)
MEAN PLATELET VOLUME: 7.3 FL (ref 6.8–10.2)
MONOCYTE AUTOMATED: 6.6 %
MONOCYTES: 0.3 (ref 0.17–1)
NA (SODIUM, SERUM): 140 MMOL/L (ref 133–144)
NEUTROPHIL ABSOLUTE: 1.8 (ref 1.91–7.6)
NEUTROPHIL AUTOMATED: 39.8 %
PLATELET COUNT: 138 K/MM3 (ref 150–450)
RED BLOOD CELL COUNT: 2.84 M/MM3 (ref 3.63–5.04)
RED CELL DISTRIBUTIO: 15.1 % (ref 11.9–15.9)
WHITE BLOOD CELL COU: 4.5 K/MM3 (ref 4–11)

## 2021-09-24 PROCEDURE — 99233 SBSQ HOSP IP/OBS HIGH 50: CPT | Performed by: INTERNAL MEDICINE

## 2021-09-25 LAB
*MEAN CORPUSCULAR HGB CONC: 33.2 G/DL (ref 32.5–35.8)
A/G RATIO: 1.52 (ref 1.1–2.4)
ALANINE AMINOTRANSFE: 23 U/L (ref 7–52)
ALBUMIN, SERUM (ALB): 3.2 G/DL (ref 3.5–5.7)
ALKALINE PHOSPHATASE (ALK): 44 U/L (ref 34–104)
ANION GAP: 5 MEQ/L (ref 6.2–14.7)
ASPARTATE AMINOTRANS: 15 U/L (ref 13–39)
BASOPHIL AUTOMATED: 0.2 %
BASOPHILS: 0 (ref 0–0.14)
BEDSIDE GLUCOSE: 120 MG/DL (ref 70–110)
BEDSIDE GLUCOSE: 273 MG/DL (ref 70–110)
BEDSIDE GLUCOSE: 343 MG/DL (ref 70–110)
BEDSIDE GLUCOSE: 365 MG/DL (ref 70–110)
BILIRUBIN, TOTAL: 0.5 MG/DL (ref 0.2–0.8)
BLOOD UREA NITROGEN (BUN): 27 MG/DL (ref 7–25)
BUN/CREATININE RATIO: 19.7 (ref 7.4–23)
CALCIUM, SERUM: 8.9 MG/DL (ref 8.6–10.3)
CARBON DIOXIDE: 28 MEQ/L (ref 21–31)
CHLORIDE, SERUM: 106 MMOL/L (ref 98–107)
CHRONIC KIDNEY DISEASE STAGE: ABNORMAL
CREATININE: 1.37 MG/DL (ref 0.6–1.2)
EOSINOPHIL AUTOMATED: 0.6 %
EOSINOPHILS: 0 (ref 0–0.6)
EST GLOMERULAR FILTRATION RATE: 40 ML/MIN
GLUCOSE: 176 MG/DL (ref 70–99)
HEMATOCRIT: 26 % (ref 34.7–45.1)
HEMOGLOBIN: 8.6 GM/DL (ref 12–15.3)
K (POTASSIUM, SERUM): 4.2 MMOL/L (ref 3.5–5.2)
LYMPHOCYTE AUTOMATED: 37.5 %
LYMPHOCYTES: 2.2 (ref 0.78–3.73)
MEAN CORPUSCULAR HGB: 30.5 PG (ref 26–34)
MEAN CORPUSCULAR VOL: 91.7 FL (ref 80–100)
MEAN PLATELET VOLUME: 7.1 FL (ref 6.8–10.2)
MONOCYTE AUTOMATED: 6.6 %
MONOCYTES: 0.4 (ref 0.17–1)
NA (SODIUM, SERUM): 139 MMOL/L (ref 133–144)
NEUTROPHIL ABSOLUTE: 3.3 (ref 1.91–7.6)
NEUTROPHIL AUTOMATED: 55.1 %
PLATELET COUNT: 131 K/MM3 (ref 150–450)
PROTEIN TOTAL: 5.3 G/DL (ref 6.4–8.9)
RED BLOOD CELL COUNT: 2.83 M/MM3 (ref 3.63–5.04)
RED CELL DISTRIBUTIO: 15.1 % (ref 11.9–15.9)
WHITE BLOOD CELL COU: 5.9 K/MM3 (ref 4–11)

## 2021-09-26 LAB
*MEAN CORPUSCULAR HGB CONC: 33 G/DL (ref 32.5–35.8)
ANION GAP: 5 MEQ/L (ref 6.2–14.7)
APPEARANCE: CLEAR
BASOPHIL AUTOMATED: 0.2 %
BASOPHILS: 0 (ref 0–0.14)
BEDSIDE GLUCOSE: 124 MG/DL (ref 70–110)
BEDSIDE GLUCOSE: 136 MG/DL (ref 70–110)
BEDSIDE GLUCOSE: 164 MG/DL (ref 70–110)
BEDSIDE GLUCOSE: 333 MG/DL (ref 70–110)
BEDSIDE GLUCOSE: 335 MG/DL (ref 70–110)
BILIRUBIN: NORMAL
BLOOD UREA NITROGEN (BUN): 27 MG/DL (ref 7–25)
BUN/CREATININE RATIO: 17.8 (ref 7.4–23)
CALCIUM, SERUM: 8.6 MG/DL (ref 8.6–10.3)
CARBON DIOXIDE: 28 MEQ/L (ref 21–31)
CHLORIDE, SERUM: 104 MMOL/L (ref 98–107)
CHRONIC KIDNEY DISEASE STAGE: ABNORMAL
COLOR: YELLOW
CREATININE: 1.52 MG/DL (ref 0.6–1.2)
EOSINOPHIL AUTOMATED: 0.6 %
EOSINOPHILS: 0 (ref 0–0.6)
EST GLOMERULAR FILTRATION RATE: 35 ML/MIN
GLUCOSE, URINE, AUTOMATED: NORMAL MG/DL
GLUCOSE: 107 MG/DL (ref 70–99)
HEMATOCRIT: 26.7 % (ref 34.7–45.1)
HEMOGLOBIN: 8.8 GM/DL (ref 12–15.3)
K (POTASSIUM, SERUM): 4.1 MMOL/L (ref 3.5–5.2)
KETONES, URINE, AUTOMATED: NORMAL MG/DL
LEUKOCYTE, URINE, AUTOMATED: NEGATIVE
LYMPHOCYTE AUTOMATED: 33.4 %
LYMPHOCYTES: 2.2 (ref 0.78–3.73)
MEAN CORPUSCULAR HGB: 30.3 PG (ref 26–34)
MEAN CORPUSCULAR VOL: 91.7 FL (ref 80–100)
MEAN PLATELET VOLUME: 6.9 FL (ref 6.8–10.2)
MONOCYTE AUTOMATED: 6.6 %
MONOCYTES: 0.4 (ref 0.17–1)
NA (SODIUM, SERUM): 137 MMOL/L (ref 133–144)
NEUTROPHIL ABSOLUTE: 4 (ref 1.91–7.6)
NEUTROPHIL AUTOMATED: 59.2 %
NITRITE, URINE AUTOMATED: NEGATIVE
PH, URINE: 5 (ref 5–8)
PLATELET COUNT: 128 K/MM3 (ref 150–450)
PROTEIN, URINE: NORMAL MG/DL
RED BLOOD CELL COUNT: 2.91 M/MM3 (ref 3.63–5.04)
RED CELL DISTRIBUTIO: 15.3 % (ref 11.9–15.9)
SPECIFIC GRAVITY UA: 1.01 (ref 1–1.03)
URINE, BLOOD, AUTOMATED: NORMAL
UROBILINOGEN, URINE, AUTOMATED: NORMAL MG/DL
WHITE BLOOD CELL COU: 6.7 K/MM3 (ref 4–11)

## 2021-09-27 ENCOUNTER — TELEPHONE (OUTPATIENT)
Dept: DERMATOLOGY | Age: 61
End: 2021-09-27

## 2021-09-27 LAB
*MEAN CORPUSCULAR HGB CONC: 33.3 G/DL (ref 32.5–35.8)
ANION GAP: 8 MEQ/L (ref 6.2–14.7)
BASOPHIL AUTOMATED: 0.5 %
BASOPHILS: 0 (ref 0–0.14)
BEDSIDE GLUCOSE: 123 MG/DL (ref 70–110)
BEDSIDE GLUCOSE: 264 MG/DL (ref 70–110)
BEDSIDE GLUCOSE: 300 MG/DL (ref 70–110)
BEDSIDE GLUCOSE: 325 MG/DL (ref 70–110)
BLOOD UREA NITROGEN (BUN): 29 MG/DL (ref 7–25)
BUN/CREATININE RATIO: 17.2 (ref 7.4–23)
CALCIUM, SERUM: 8.7 MG/DL (ref 8.6–10.3)
CARBON DIOXIDE: 25 MEQ/L (ref 21–31)
CHLORIDE, SERUM: 103 MMOL/L (ref 98–107)
CHRONIC KIDNEY DISEASE STAGE: ABNORMAL
CREATININE: 1.69 MG/DL (ref 0.6–1.2)
EOSINOPHIL AUTOMATED: 0.6 %
EOSINOPHILS: 0 (ref 0–0.6)
EST GLOMERULAR FILTRATION RATE: 31 ML/MIN
FK506 (TACROLIMUS): 5.5 NG/ML (ref 5–20)
GLUCOSE: 144 MG/DL (ref 70–99)
HEMATOCRIT: 27 % (ref 34.7–45.1)
HEMOGLOBIN: 9 GM/DL (ref 12–15.3)
K (POTASSIUM, SERUM): 4.1 MMOL/L (ref 3.5–5.2)
LYMPHOCYTE AUTOMATED: 32.1 %
LYMPHOCYTES: 2.5 (ref 0.78–3.73)
MEAN CORPUSCULAR HGB: 30.7 PG (ref 26–34)
MEAN CORPUSCULAR VOL: 92.1 FL (ref 80–100)
MEAN PLATELET VOLUME: 7.4 FL (ref 6.8–10.2)
MONOCYTE AUTOMATED: 9.3 %
MONOCYTES: 0.7 (ref 0.17–1)
NA (SODIUM, SERUM): 136 MMOL/L (ref 133–144)
NEUTROPHIL ABSOLUTE: 4.5 (ref 1.91–7.6)
NEUTROPHIL AUTOMATED: 57.5 %
PLATELET COUNT: 139 K/MM3 (ref 150–450)
RED BLOOD CELL COUNT: 2.93 M/MM3 (ref 3.63–5.04)
RED CELL DISTRIBUTIO: 15.4 % (ref 11.9–15.9)
WHITE BLOOD CELL COU: 7.8 K/MM3 (ref 4–11)

## 2021-09-28 ENCOUNTER — APPOINTMENT (OUTPATIENT)
Dept: ENDOCRINOLOGY | Age: 61
End: 2021-09-28

## 2021-09-28 LAB
*MEAN CORPUSCULAR HGB CONC: 31.7 G/DL (ref 32.5–35.8)
A/G RATIO: 1.35 (ref 1.1–2.4)
ADENOVIRUS: NOT DETECTED
ALANINE AMINOTRANSFE: 17 U/L (ref 7–52)
ALBUMIN, SERUM (ALB): 3.1 G/DL (ref 3.5–5.7)
ALKALINE PHOSPHATASE (ALK): 35 U/L (ref 34–104)
ANION GAP: 5 MEQ/L (ref 6.2–14.7)
ASPARTATE AMINOTRANS: 9 U/L (ref 13–39)
BASOPHIL AUTOMATED: 0.3 %
BASOPHILS: 0 (ref 0–0.14)
BEDSIDE GLUCOSE: 262 MG/DL (ref 70–110)
BEDSIDE GLUCOSE: 322 MG/DL (ref 70–110)
BEDSIDE GLUCOSE: 347 MG/DL (ref 70–110)
BEDSIDE GLUCOSE: 99 MG/DL (ref 70–110)
BILIRUBIN, TOTAL: 0.5 MG/DL (ref 0.2–0.8)
BLOOD UREA NITROGEN (BUN): 29 MG/DL (ref 7–25)
BORDETELLA PARAPERTUSSIS: NOT DETECTED
BORDETELLA PERTUSSIS: NOT DETECTED
BUN/CREATININE RATIO: 16.9 (ref 7.4–23)
CALCIUM, SERUM: 8 MG/DL (ref 8.6–10.3)
CARBON DIOXIDE: 27 MEQ/L (ref 21–31)
CHLAMYDIA PNEUMONIAE: NOT DETECTED
CHLORIDE, SERUM: 106 MMOL/L (ref 98–107)
CHRONIC KIDNEY DISEASE STAGE: ABNORMAL
CORONAVIRUS 229E: NOT DETECTED
CORONAVIRUS HKU1: NOT DETECTED
CORONAVIRUS NL63: NOT DETECTED
CORONAVIRUS OC43: NOT DETECTED
CREATININE: 1.72 MG/DL (ref 0.6–1.2)
EOSINOPHIL AUTOMATED: 0.5 %
EOSINOPHILS: 0 (ref 0–0.6)
EST GLOMERULAR FILTRATION RATE: 30 ML/MIN
GLUCOSE: 146 MG/DL (ref 70–99)
HEMATOCRIT: 23.2 % (ref 34.7–45.1)
HEMOGLOBIN: 7.4 GM/DL (ref 12–15.3)
INFLUENZA A: NOT DETECTED
INFLUENZA B: NOT DETECTED
K (POTASSIUM, SERUM): 4.3 MMOL/L (ref 3.5–5.2)
LYMPHOCYTE AUTOMATED: 33.7 %
LYMPHOCYTES: 2.6 (ref 0.78–3.73)
MEAN CORPUSCULAR HGB: 29.2 PG (ref 26–34)
MEAN CORPUSCULAR VOL: 91.9 FL (ref 80–100)
MEAN PLATELET VOLUME: 7.1 FL (ref 6.8–10.2)
METAPNEUMOVIRUS: NOT DETECTED
MG (MAGNESIUM, SERUM: 1.8 MG/DL (ref 1.6–2.6)
MONOCYTE AUTOMATED: 7.5 %
MONOCYTES: 0.6 (ref 0.17–1)
MYCOPLASMA PNEUMONIAE: NOT DETECTED
NA (SODIUM, SERUM): 138 MMOL/L (ref 133–144)
NEUTROPHIL ABSOLUTE: 4.4 (ref 1.91–7.6)
NEUTROPHIL AUTOMATED: 58 %
PARAINFLUENZA 1: NOT DETECTED
PARAINFLUENZA 2: NOT DETECTED
PARAINFLUENZA 3: NOT DETECTED
PARAINFLUENZA 4: NOT DETECTED
PHOSPHORUS, SERUM: 3.1 MG/DL (ref 2.5–4.5)
PLATELET COUNT: 140 K/MM3 (ref 150–450)
PROTEIN TOTAL: 5.4 G/DL (ref 6.4–8.9)
RED BLOOD CELL COUNT: 2.52 M/MM3 (ref 3.63–5.04)
RED CELL DISTRIBUTIO: 15.5 % (ref 11.9–15.9)
RESPIRATORY VIRAL PANEL SOURCE: NORMAL
RHINO/ENTEROVIRUS: NOT DETECTED
RSV: NOT DETECTED
SARS COV2: NOT DETECTED
WHITE BLOOD CELL COU: 7.6 K/MM3 (ref 4–11)

## 2021-09-29 LAB
*MEAN CORPUSCULAR HGB CONC: 32.1 G/DL (ref 32.5–35.8)
*MEAN CORPUSCULAR HGB CONC: 32.6 G/DL (ref 32.5–35.8)
A/G RATIO: 1.3 (ref 1.1–2.4)
ALANINE AMINOTRANSFE: 15 U/L (ref 7–52)
ALBUMIN, SERUM (ALB): 3 G/DL (ref 3.5–5.7)
ALKALINE PHOSPHATASE (ALK): 34 U/L (ref 34–104)
ANION GAP: 6 MEQ/L (ref 6.2–14.7)
ASPARTATE AMINOTRANS: 9 U/L (ref 13–39)
BASOPHIL AUTOMATED: 0.3 %
BASOPHILS: 0 (ref 0–0.14)
BEDSIDE GLUCOSE: 144 MG/DL (ref 70–110)
BEDSIDE GLUCOSE: 199 MG/DL (ref 70–110)
BEDSIDE GLUCOSE: 206 MG/DL (ref 70–110)
BEDSIDE GLUCOSE: 83 MG/DL (ref 70–110)
BILIRUBIN, TOTAL: 0.4 MG/DL (ref 0.2–0.8)
BLOOD UREA NITROGEN (BUN): 28 MG/DL (ref 7–25)
BUN/CREATININE RATIO: 17.8 (ref 7.4–23)
C-REACTIVE PROTEIN, TITR: 4.7 MG/DL (ref 0–0.9)
CALCIUM, SERUM: 8 MG/DL (ref 8.6–10.3)
CARBON DIOXIDE: 25 MEQ/L (ref 21–31)
CHLORIDE, SERUM: 109 MMOL/L (ref 98–107)
CHRONIC KIDNEY DISEASE STAGE: ABNORMAL
CREATININE: 1.57 MG/DL (ref 0.6–1.2)
D-DIMER, QUANTITATIVE: 553 NG/MLFEU (ref 0–622)
EOSINOPHIL AUTOMATED: 1.2 %
EOSINOPHILS: 0.1 (ref 0–0.6)
EST GLOMERULAR FILTRATION RATE: 34 ML/MIN
GLUCOSE: 101 MG/DL (ref 70–99)
HEMATOCRIT: 21.6 % (ref 34.7–45.1)
HEMATOCRIT: 23.7 % (ref 34.7–45.1)
HEMOGLOBIN: 7.1 GM/DL (ref 12–15.3)
HEMOGLOBIN: 7.6 GM/DL (ref 12–15.3)
K (POTASSIUM, SERUM): 3.9 MMOL/L (ref 3.5–5.2)
LEGIONELLA AG URINE: NORMAL
LYMPHOCYTE AUTOMATED: 44.4 %
LYMPHOCYTES: 2.2 (ref 0.78–3.73)
MEAN CORPUSCULAR HGB: 29.7 PG (ref 26–34)
MEAN CORPUSCULAR HGB: 29.9 PG (ref 26–34)
MEAN CORPUSCULAR VOL: 91.7 FL (ref 80–100)
MEAN CORPUSCULAR VOL: 92.4 FL (ref 80–100)
MEAN PLATELET VOLUME: 7.2 FL (ref 6.8–10.2)
MEAN PLATELET VOLUME: 7.4 FL (ref 6.8–10.2)
MONOCYTE AUTOMATED: 7.2 %
MONOCYTES: 0.4 (ref 0.17–1)
NA (SODIUM, SERUM): 140 MMOL/L (ref 133–144)
NEUTROPHIL ABSOLUTE: 2.3 (ref 1.91–7.6)
NEUTROPHIL AUTOMATED: 46.9 %
PLATELET COUNT: 124 K/MM3 (ref 150–450)
PLATELET COUNT: 128 K/MM3 (ref 150–450)
PROCALCITONIN (NUMERIC): 0.08 NG/ML (ref 0.2–0.5)
PROTEIN TOTAL: 5.3 G/DL (ref 6.4–8.9)
RED BLOOD CELL COUNT: 2.36 M/MM3 (ref 3.63–5.04)
RED BLOOD CELL COUNT: 2.57 M/MM3 (ref 3.63–5.04)
RED CELL DISTRIBUTIO: 15.4 % (ref 11.9–15.9)
RED CELL DISTRIBUTIO: 15.4 % (ref 11.9–15.9)
SEDIMENTATION RATE: 15 MM/HR (ref 0–30)
STREP PNEUMONIAE AG, URINE: NORMAL
WHITE BLOOD CELL COU: 4.5 K/MM3 (ref 4–11)
WHITE BLOOD CELL COU: 4.9 K/MM3 (ref 4–11)

## 2021-09-30 LAB
*MEAN CORPUSCULAR HGB CONC: 32.6 G/DL (ref 32.5–35.8)
*MEAN CORPUSCULAR HGB CONC: 32.6 G/DL (ref 32.5–35.8)
A/G RATIO: 1.35 (ref 1.1–2.4)
ALANINE AMINOTRANSFE: 14 U/L (ref 7–52)
ALBUMIN, SERUM (ALB): 3.1 G/DL (ref 3.5–5.7)
ALKALINE PHOSPHATASE (ALK): 34 U/L (ref 34–104)
ANION GAP: 6 MEQ/L (ref 6.2–14.7)
ASPARTATE AMINOTRANS: 8 U/L (ref 13–39)
BEDSIDE GLUCOSE: 160 MG/DL (ref 70–110)
BEDSIDE GLUCOSE: 180 MG/DL (ref 70–110)
BEDSIDE GLUCOSE: 222 MG/DL (ref 70–110)
BEDSIDE GLUCOSE: 98 MG/DL (ref 70–110)
BILIRUBIN, TOTAL: 0.4 MG/DL (ref 0.2–0.8)
BLOOD UREA NITROGEN (BUN): 28 MG/DL (ref 7–25)
BUN/CREATININE RATIO: 17 (ref 7.4–23)
CALCIUM, SERUM: 8.4 MG/DL (ref 8.6–10.3)
CARBON DIOXIDE: 25 MEQ/L (ref 21–31)
CHLORIDE, SERUM: 109 MMOL/L (ref 98–107)
CHRONIC KIDNEY DISEASE STAGE: ABNORMAL
CREATININE: 1.65 MG/DL (ref 0.6–1.2)
EST GLOMERULAR FILTRATION RATE: 32 ML/MIN
GLUCOSE: 166 MG/DL (ref 70–99)
HEMATOCRIT: 21.9 % (ref 34.7–45.1)
HEMATOCRIT: 24 % (ref 34.7–45.1)
HEMATOCRIT: 26.9 % (ref 34.7–45.1)
HEMOGLOBIN: 7.1 GM/DL (ref 12–15.3)
HEMOGLOBIN: 7.8 GM/DL (ref 12–15.3)
HEMOGLOBIN: 8.7 GM/DL (ref 12–15.3)
HISTOPLASMA AG URINE INTERP: NOT DETECTED
HISTOPLASMA ANTIGEN URINE: NOT DETECTED NG/ML
K (POTASSIUM, SERUM): 4.4 MMOL/L (ref 3.5–5.2)
LDH: 164 U/L (ref 140–271)
MEAN CORPUSCULAR HGB: 30 PG (ref 26–34)
MEAN CORPUSCULAR HGB: 30 PG (ref 26–34)
MEAN CORPUSCULAR VOL: 92 FL (ref 80–100)
MEAN CORPUSCULAR VOL: 92.2 FL (ref 80–100)
MEAN PLATELET VOLUME: 7.2 FL (ref 6.8–10.2)
MEAN PLATELET VOLUME: 7.4 FL (ref 6.8–10.2)
MISC RESULTS 1: NORMAL
NA (SODIUM, SERUM): 140 MMOL/L (ref 133–144)
PLATELET COUNT: 131 K/MM3 (ref 150–450)
PLATELET COUNT: 151 K/MM3 (ref 150–450)
PROTEIN TOTAL: 5.4 G/DL (ref 6.4–8.9)
RED BLOOD CELL COUNT: 2.37 M/MM3 (ref 3.63–5.04)
RED BLOOD CELL COUNT: 2.61 M/MM3 (ref 3.63–5.04)
RED CELL DISTRIBUTIO: 15.1 % (ref 11.9–15.9)
RED CELL DISTRIBUTIO: 15.2 % (ref 11.9–15.9)
WHITE BLOOD CELL COU: 4.7 K/MM3 (ref 4–11)
WHITE BLOOD CELL COU: 4.7 K/MM3 (ref 4–11)

## 2021-10-01 ENCOUNTER — OFFICE VISIT (OUTPATIENT)
Dept: ENDOCRINOLOGY | Age: 61
End: 2021-10-01

## 2021-10-01 ENCOUNTER — TELEPHONE (OUTPATIENT)
Dept: CHRONIC DISEASE MANAGEMENT | Age: 61
End: 2021-10-01

## 2021-10-01 VITALS
HEIGHT: 68 IN | SYSTOLIC BLOOD PRESSURE: 128 MMHG | TEMPERATURE: 97.8 F | BODY MASS INDEX: 35.92 KG/M2 | HEART RATE: 70 BPM | DIASTOLIC BLOOD PRESSURE: 60 MMHG | WEIGHT: 237 LBS | OXYGEN SATURATION: 95 %

## 2021-10-01 DIAGNOSIS — E78.5 DYSLIPIDEMIA: ICD-10-CM

## 2021-10-01 DIAGNOSIS — E11.65 UNCONTROLLED TYPE 2 DIABETES MELLITUS WITH HYPERGLYCEMIA (CMD): Primary | ICD-10-CM

## 2021-10-01 DIAGNOSIS — E66.9 OBESITY (BMI 30-39.9): ICD-10-CM

## 2021-10-01 PROCEDURE — 99204 OFFICE O/P NEW MOD 45 MIN: CPT | Performed by: INTERNAL MEDICINE

## 2021-10-01 RX ORDER — BACLOFEN 10 MG/1
10 TABLET ORAL DAILY
COMMUNITY

## 2021-10-01 RX ORDER — GABAPENTIN 400 MG/1
CAPSULE ORAL
COMMUNITY
Start: 2021-09-30

## 2021-10-01 RX ORDER — GLIPIZIDE 10 MG/1
10 TABLET ORAL
COMMUNITY
End: 2021-12-10 | Stop reason: ALTCHOICE

## 2021-10-01 RX ORDER — SULFAMETHOXAZOLE AND TRIMETHOPRIM 800; 160 MG/1; MG/1
TABLET ORAL
COMMUNITY
Start: 2021-09-30

## 2021-10-01 RX ORDER — MYCOPHENOLIC ACID 180 MG/1
TABLET, DELAYED RELEASE ORAL
COMMUNITY
Start: 2021-09-21

## 2021-10-01 RX ORDER — FLUTICASONE PROPIONATE 50 MCG
1 SPRAY, SUSPENSION (ML) NASAL DAILY
COMMUNITY
Start: 2020-12-10 | End: 2021-12-10

## 2021-10-01 RX ORDER — TACROLIMUS 4 MG/1
TABLET, EXTENDED RELEASE ORAL
COMMUNITY
Start: 2021-09-21

## 2021-10-01 RX ORDER — INSULIN GLARGINE 100 [IU]/ML
INJECTION, SOLUTION SUBCUTANEOUS
COMMUNITY
Start: 2021-09-30 | End: 2021-10-01 | Stop reason: SDUPTHER

## 2021-10-01 RX ORDER — LEVOTHYROXINE SODIUM 112 UG/1
112 TABLET ORAL
COMMUNITY

## 2021-10-01 RX ORDER — PIOGLITAZONEHYDROCHLORIDE 15 MG/1
15 TABLET ORAL DAILY
COMMUNITY
End: 2021-12-10 | Stop reason: ALTCHOICE

## 2021-10-01 RX ORDER — CEFDINIR 300 MG/1
CAPSULE ORAL
COMMUNITY
Start: 2021-09-30 | End: 2021-10-01 | Stop reason: ALTCHOICE

## 2021-10-01 RX ORDER — TACROLIMUS 1 MG/1
TABLET, EXTENDED RELEASE ORAL
COMMUNITY
Start: 2021-09-21

## 2021-10-01 RX ORDER — TRAZODONE HYDROCHLORIDE 100 MG/1
TABLET ORAL
COMMUNITY
Start: 2021-09-03

## 2021-10-01 RX ORDER — PEN NEEDLE, DIABETIC 32GX 5/32"
NEEDLE, DISPOSABLE MISCELLANEOUS
COMMUNITY
Start: 2021-09-30 | End: 2021-11-11 | Stop reason: ALTCHOICE

## 2021-10-01 RX ORDER — PANTOPRAZOLE SODIUM 40 MG/1
TABLET, DELAYED RELEASE ORAL
COMMUNITY
Start: 2021-09-21

## 2021-10-01 RX ORDER — HYDROCODONE BITARTRATE AND ACETAMINOPHEN 5; 325 MG/1; MG/1
TABLET ORAL
COMMUNITY
Start: 2021-08-30

## 2021-10-01 RX ORDER — LORATADINE 10 MG/1
10 TABLET ORAL DAILY
COMMUNITY

## 2021-10-01 RX ORDER — ATORVASTATIN CALCIUM 40 MG/1
TABLET, FILM COATED ORAL
COMMUNITY
Start: 2021-09-21

## 2021-10-01 RX ORDER — INSULIN GLARGINE 100 [IU]/ML
INJECTION, SOLUTION SUBCUTANEOUS
Qty: 27 ML | Refills: 1 | Status: SHIPPED | OUTPATIENT
Start: 2021-10-01 | End: 2021-11-11 | Stop reason: SDUPTHER

## 2021-10-01 RX ORDER — PREDNISONE 5 MG/1
TABLET ORAL
COMMUNITY
Start: 2021-09-21

## 2021-10-01 ASSESSMENT — PAIN SCALES - GENERAL: PAINLEVEL: 0

## 2021-10-02 LAB
BLOOD CULTURE: NORMAL
BLOOD CULTURE: NORMAL

## 2021-10-04 LAB
ASPERGILLUS ANTIBODY: NORMAL
BLASTOMYCES ANTIBODY: NORMAL
COCCIDIODES ANTIBODY: NORMAL
HISTOPLASMA ANTIBODY: NORMAL

## 2021-10-14 ENCOUNTER — APPOINTMENT (OUTPATIENT)
Dept: CHRONIC DISEASE MANAGEMENT | Age: 61
End: 2021-10-14

## 2021-10-18 ENCOUNTER — PHARMACIST CLINIC VISIT (OUTPATIENT)
Dept: CHRONIC DISEASE MANAGEMENT | Age: 61
End: 2021-10-18

## 2021-10-18 PROCEDURE — 99211 OFF/OP EST MAY X REQ PHY/QHP: CPT | Performed by: PHARMACIST

## 2021-10-21 ENCOUNTER — LAB SERVICES (OUTPATIENT)
Dept: LAB | Age: 61
End: 2021-10-21

## 2021-10-21 ENCOUNTER — LAB REQUISITION (OUTPATIENT)
Dept: LAB | Age: 61
End: 2021-10-21

## 2021-10-21 DIAGNOSIS — Z94.4 LIVER REPLACED BY TRANSPLANT (CMD): ICD-10-CM

## 2021-10-21 DIAGNOSIS — Z51.81 ENCOUNTER FOR THERAPEUTIC DRUG LEVEL MONITORING: ICD-10-CM

## 2021-10-21 DIAGNOSIS — Z51.81 ENCOUNTER FOR THERAPEUTIC DRUG MONITORING: ICD-10-CM

## 2021-10-21 DIAGNOSIS — Z94.4 LIVER TRANSPLANT STATUS (CMD): ICD-10-CM

## 2021-10-21 LAB
BASOPHIL %: 0.4 % (ref 0–1.2)
BASOPHIL ABSOLUTE #: 0 10*3/UL (ref 0–0.1)
DIFFERENTIAL TYPE: ABNORMAL
EOSINOPHIL %: 2.3 % (ref 0–10)
EOSINOPHIL ABSOLUTE #: 0.1 10*3/UL (ref 0–0.5)
GGT SERPL-CCNC: 23 U/L (ref 12–43)
HEMATOCRIT: 32.3 % (ref 34–45)
HEMOGLOBIN: 10 G/DL (ref 11.2–15.7)
IMMATURE GRANULOCYTE ABSOLUTE: 0.02 10*3/UL (ref 0–0.05)
IMMATURE GRANULOCYTE PERCENT: 0.4 % (ref 0–0.5)
LYMPH PERCENT: 56 % (ref 20.5–51.1)
LYMPHOCYTE ABSOLUTE #: 3 10*3/UL (ref 1.2–3.4)
MEAN CORPUSCULAR HGB CONCENTRATION: 31 % (ref 32–36)
MEAN CORPUSCULAR HGB: 29.2 PG (ref 27–34)
MEAN CORPUSCULAR VOLUME: 94.2 FL (ref 79–95)
MEAN PLATELET VOLUME: 9.9 FL (ref 8.6–12.4)
MONOCYTE ABSOLUTE #: 0.4 10*3/UL (ref 0.2–0.9)
MONOCYTE PERCENT: 8.3 % (ref 4.3–12.9)
NEUTROPHIL ABSOLUTE #: 1.7 10*3/UL (ref 1.4–6.5)
NEUTROPHIL PERCENT: 32.6 % (ref 34–73.5)
PLATELET COUNT: 161 10*3/UL (ref 150–400)
RED BLOOD CELL COUNT: 3.43 10*6/UL (ref 3.7–5.2)
RED CELL DISTRIBUTION WIDTH: 14.9 % (ref 11.3–14.8)
WHITE BLOOD CELL COUNT: 5.3 10*3/UL (ref 4–10)

## 2021-10-21 PROCEDURE — 82248 BILIRUBIN DIRECT: CPT | Performed by: INTERNAL MEDICINE

## 2021-10-21 PROCEDURE — 82977 ASSAY OF GGT: CPT | Performed by: INTERNAL MEDICINE

## 2021-10-21 PROCEDURE — 36415 COLL VENOUS BLD VENIPUNCTURE: CPT | Performed by: INTERNAL MEDICINE

## 2021-10-21 PROCEDURE — 80197 ASSAY OF TACROLIMUS: CPT | Performed by: CLINICAL MEDICAL LABORATORY

## 2021-10-21 PROCEDURE — 85025 COMPLETE CBC W/AUTO DIFF WBC: CPT | Performed by: INTERNAL MEDICINE

## 2021-10-21 PROCEDURE — 80053 COMPREHEN METABOLIC PANEL: CPT | Performed by: INTERNAL MEDICINE

## 2021-10-22 LAB
ALBUMIN SERPL BCG-MCNC: 4.2 G/DL (ref 3.6–5.1)
ALP SERPL-CCNC: 64 U/L (ref 45–130)
ALT SERPL W/O P-5'-P-CCNC: 20 U/L (ref 7–34)
AST SERPL-CCNC: 14 U/L (ref 9–37)
BILIRUB DIRECT SERPL-MCNC: 0.1 MG/DL (ref 0–0.2)
BILIRUB SERPL-MCNC: 0.3 MG/DL (ref 0–1)
BUN SERPL-MCNC: 41 MG/DL (ref 7–20)
CALCIUM SERPL-MCNC: 9.7 MG/DL (ref 8.6–10.6)
CHLORIDE SERPL-SCNC: 100 MMOL/L (ref 96–107)
CREAT SERPL-MCNC: 1.8 MG/DL (ref 0.5–1.4)
GFR SERPL CREATININE-BSD FRML MDRD: 28 ML/MIN/{1.73M2}
GFR SERPL CREATININE-BSD FRML MDRD: 34 ML/MIN/{1.73M2}
GLUCOSE SERPL-MCNC: 127 MG/DL (ref 70–200)
HCO3 SERPL-SCNC: 27 MMOL/L (ref 22–32)
POTASSIUM SERPL-SCNC: 3.9 MMOL/L (ref 3.5–5.3)
PROT SERPL-MCNC: 6.3 G/DL (ref 6.2–8.1)
SODIUM SERPL-SCNC: 141 MMOL/L (ref 136–146)
TACROLIMUS BLD-MCNC: 8.5 NG/ML (ref 5–20)
TACROLIMUS BLD-MCNC: 8.5 NG/ML (ref 5–20)

## 2021-10-25 LAB — FAX RESULTS: NORMAL

## 2021-10-27 ENCOUNTER — TELEPHONIC VISIT (OUTPATIENT)
Dept: CHRONIC DISEASE MANAGEMENT | Age: 61
End: 2021-10-27

## 2021-10-27 PROCEDURE — 98967 PH1 ASSMT&MGMT NQHP 11-20: CPT | Performed by: PHARMACIST

## 2021-11-11 ENCOUNTER — TELEPHONIC VISIT (OUTPATIENT)
Dept: CHRONIC DISEASE MANAGEMENT | Age: 61
End: 2021-11-11

## 2021-11-11 PROCEDURE — 98967 PH1 ASSMT&MGMT NQHP 11-20: CPT | Performed by: PHARMACIST

## 2021-11-11 RX ORDER — INSULIN GLARGINE 100 [IU]/ML
INJECTION, SOLUTION SUBCUTANEOUS
Qty: 45 ML | Refills: 1 | Status: SHIPPED | OUTPATIENT
Start: 2021-11-11 | End: 2021-11-17 | Stop reason: SDUPTHER

## 2021-11-11 RX ORDER — BLOOD-GLUCOSE METER
EACH MISCELLANEOUS
Qty: 100 EACH | Refills: 3 | Status: SHIPPED | OUTPATIENT
Start: 2021-11-11

## 2021-11-12 ENCOUNTER — APPOINTMENT (OUTPATIENT)
Dept: ENDOCRINOLOGY | Age: 61
End: 2021-11-12

## 2021-11-16 ENCOUNTER — TELEPHONE (OUTPATIENT)
Dept: ENDOCRINOLOGY | Age: 61
End: 2021-11-16

## 2021-11-16 RX ORDER — LANCETS
EACH MISCELLANEOUS
Qty: 100 EACH | Refills: 1 | Status: SHIPPED | OUTPATIENT
Start: 2021-11-16

## 2021-11-17 ENCOUNTER — TELEPHONE (OUTPATIENT)
Dept: ENDOCRINOLOGY | Age: 61
End: 2021-11-17

## 2021-11-17 RX ORDER — INSULIN GLARGINE 100 [IU]/ML
INJECTION, SOLUTION SUBCUTANEOUS
Qty: 45 ML | Refills: 1 | Status: SHIPPED | OUTPATIENT
Start: 2021-11-17

## 2021-11-19 ENCOUNTER — TELEPHONIC VISIT (OUTPATIENT)
Dept: CHRONIC DISEASE MANAGEMENT | Age: 61
End: 2021-11-19

## 2021-11-19 PROCEDURE — 98968 PH1 ASSMT&MGMT NQHP 21-30: CPT

## 2021-11-23 ENCOUNTER — APPOINTMENT (OUTPATIENT)
Dept: ENDOCRINOLOGY | Age: 61
End: 2021-11-23

## 2021-11-24 ENCOUNTER — TELEPHONE (OUTPATIENT)
Dept: ENDOCRINOLOGY | Age: 61
End: 2021-11-24

## 2021-12-10 ENCOUNTER — APPOINTMENT (OUTPATIENT)
Dept: ENDOCRINOLOGY | Age: 61
End: 2021-12-10

## 2021-12-10 ENCOUNTER — OFFICE VISIT (OUTPATIENT)
Dept: ENDOCRINOLOGY | Age: 61
End: 2021-12-10

## 2021-12-10 VITALS
HEART RATE: 71 BPM | BODY MASS INDEX: 34.86 KG/M2 | SYSTOLIC BLOOD PRESSURE: 128 MMHG | DIASTOLIC BLOOD PRESSURE: 76 MMHG | WEIGHT: 230 LBS | HEIGHT: 68 IN | OXYGEN SATURATION: 95 %

## 2021-12-10 DIAGNOSIS — E11.65 TYPE 2 DIABETES MELLITUS WITH HYPERGLYCEMIA, WITH LONG-TERM CURRENT USE OF INSULIN (CMD): Primary | ICD-10-CM

## 2021-12-10 DIAGNOSIS — Z79.4 TYPE 2 DIABETES MELLITUS WITH HYPERGLYCEMIA, WITH LONG-TERM CURRENT USE OF INSULIN (CMD): Primary | ICD-10-CM

## 2021-12-10 DIAGNOSIS — E66.9 OBESITY (BMI 30-39.9): ICD-10-CM

## 2021-12-10 DIAGNOSIS — E78.5 DYSLIPIDEMIA: ICD-10-CM

## 2021-12-10 PROCEDURE — 99214 OFFICE O/P EST MOD 30 MIN: CPT | Performed by: INTERNAL MEDICINE

## 2021-12-10 RX ORDER — SEMAGLUTIDE 1.34 MG/ML
INJECTION, SOLUTION SUBCUTANEOUS
Qty: 6 ML | Refills: 0 | Status: SHIPPED | OUTPATIENT
Start: 2021-12-10

## 2021-12-10 RX ORDER — DAPSONE 100 MG/1
100 TABLET ORAL DAILY
COMMUNITY

## 2022-01-28 ENCOUNTER — TELEPHONIC VISIT (OUTPATIENT)
Dept: CHRONIC DISEASE MANAGEMENT | Age: 62
End: 2022-01-28

## 2022-01-28 PROCEDURE — 98968 PH1 ASSMT&MGMT NQHP 21-30: CPT

## 2022-02-25 ENCOUNTER — TELEPHONE (OUTPATIENT)
Dept: CHRONIC DISEASE MANAGEMENT | Age: 62
End: 2022-02-25

## 2022-06-06 RX ORDER — BLOOD SUGAR DIAGNOSTIC
STRIP MISCELLANEOUS
Qty: 100 STRIP | Refills: 1 | Status: SHIPPED | OUTPATIENT
Start: 2022-06-06

## 2022-09-16 ENCOUNTER — LAB SERVICES (OUTPATIENT)
Dept: LAB | Age: 62
End: 2022-09-16

## 2023-05-09 ENCOUNTER — LAB SERVICES (OUTPATIENT)
Dept: LAB | Age: 63
End: 2023-05-09

## 2023-05-09 DIAGNOSIS — Z94.4 STATUS POST LIVER TRANSPLANTATION (CMD): ICD-10-CM

## 2023-05-09 DIAGNOSIS — Z51.81 THERAPEUTIC DRUG MONITORING: ICD-10-CM

## 2023-05-09 LAB — TACROLIMUS BLD-MCNC: 6.7 NG/ML (ref 5–20)

## 2023-05-09 PROCEDURE — 36415 COLL VENOUS BLD VENIPUNCTURE: CPT | Performed by: INTERNAL MEDICINE

## 2023-05-09 PROCEDURE — 80197 ASSAY OF TACROLIMUS: CPT | Performed by: CLINICAL MEDICAL LABORATORY
